# Patient Record
Sex: MALE | Race: WHITE
[De-identification: names, ages, dates, MRNs, and addresses within clinical notes are randomized per-mention and may not be internally consistent; named-entity substitution may affect disease eponyms.]

---

## 2018-06-09 ENCOUNTER — HOSPITAL ENCOUNTER (EMERGENCY)
Dept: HOSPITAL 50 - VM.ED | Age: 33
Discharge: HOME | End: 2018-06-09
Payer: COMMERCIAL

## 2018-06-09 DIAGNOSIS — S71.111A: Primary | ICD-10-CM

## 2018-06-09 DIAGNOSIS — F17.210: ICD-10-CM
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
## 2018-06-09 NOTE — EDM.PDOC
ED HPI GENERAL MEDICAL PROBLEM





- General


Chief Complaint: Lower Extremity Injury/Pain


Stated Complaint: chainsaw cut


Time Seen by Provider: 06/09/18 15:56


Source of Information: Reports: Patient


History Limitations: Reports: No Limitations





- History of Present Illness


INITIAL COMMENTS - FREE TEXT/NARRATIVE: 





Patient was cutting wood and during his last cut the wood kicked back, causing 

the blade to hit his leg above the right knee.  He rates pain a 5/10 and is 

bearable.  He has no other complaints and has a belt wrapped around his leg.  


Onset: Today, Sudden


Onset Time: 16:00


Location: Reports: Lower Extremity, Right


Quality: Reports: Ache


Severity: Mild


  ** Right Upper Leg


Pain Score (Numeric/FACES): 5





- Related Data


 Allergies











Allergy/AdvReac Type Severity Reaction Status Date / Time


 


No Known Allergies Allergy   Verified 06/09/18 16:20











Home Meds: 


 Home Meds





. [No Known Home Meds]  06/09/18 [History]











Past Medical History





- Past Health History


Medical/Surgical History: Denies Medical/Surgical History





Social & Family History





- Tobacco Use


Smoking Status *Q: Current Every Day Smoker


Years of Tobacco use: 20


Packs/Tins Daily: 1





Review of Systems





- Review of Systems


Review Of Systems: See Below


Constitutional: Reports: No Symptoms


Eyes: Reports: No Symptoms


Ears: Reports: No Symptoms


Nose: Reports: No Symptoms


Mouth/Throat: Reports: No Symptoms


Respiratory: Reports: No Symptoms


Cardiovascular: Reports: No Symptoms


GI/Abdominal: Reports: No Symptoms


Genitourinary: Reports: No Symptoms


Musculoskeletal: Reports: No Symptoms


Skin: Reports: Wound


Neurological: Reports: No Symptoms


Psychiatric: Reports: No Symptoms





ED EXAM, GENERAL





- Physical Exam


Exam: See Below


Exam Limited By: No Limitations


General Appearance: Alert, WD/WN, No Apparent Distress


Peripheral Pulses: 2+: Posterior Tibial (L), Posterior Tibial (R), Dorsalis 

Pedis (L), Dorsalis Pedis (R)


Extremities: Normal Range of Motion, Non-Tender, Normal Capillary Refill


Neurological: Alert, Oriented, CN II-XII Intact, Normal Cognition, Normal Gait, 

Normal Reflexes, No Motor/Sensory Deficits


Psychiatric: Normal Affect, Normal Mood


Skin Exam: Wound/Incision (9 cm laceration to right lower thigh)


Lymphatic: No Adenopathy





ED TRAUMA EXTREMITY PROCEDURES





- Laceration/Wound Repair


  ** Right Lower Thigh


Lac/Wound Length In cm: 9


Appearance: Superficial, Linear


Distal NVT: Neuro & Vascular Intact


Anesthetic Type: Local


Local Anesthesia - Lidocaine (Xylocaine): 1% with EPI


Local Anesthetic Volume: 5cc


Skin Prep: Chlorhexidine (Hibiciens)


Exploration/Debridement/Repair: Wound Explored, In a Bloodless Field, No 

Foreign Material Found


Suture Size: 4-0


Suture Type: Running


Sterile Dressing Applied: Nurse


Tetanus Status Addressed: Yes


Complications: No





Course





- Vital Signs


Last Recorded V/S: 





 Last Vital Signs











Temp  36.3 C   06/09/18 15:54


 


Pulse  94   06/09/18 15:54


 


Resp  18   06/09/18 15:54


 


BP  138/85   06/09/18 15:54


 


Pulse Ox  97   06/09/18 15:54














- Orders/Labs/Meds


Orders: 





 Active Orders 24 hr











 Category Date Time Status


 


 Femur Min 2V Rt [CR] Stat Exams  06/09/18 16:16 Ordered











Meds: 





Medications














Discontinued Medications














Generic Name Dose Route Start Last Admin





  Trade Name Juan  PRN Reason Stop Dose Admin


 


Lidocaine/Epinephrine  20 ml  06/09/18 16:04  





  Xylocaine 2% With Epinephrine 1:100,000  INJECT  06/09/18 16:05  





  ONETIME ONE   





     





     





     





     














- Radiology Interpretation


Free Text/Narrative:: 





x-ray negative for foreign body, nothing seen on inspection and cleaning either





Departure





- Departure


Time of Disposition: 17:14


Disposition: Home, Self-Care 01


Condition: Good


Clinical Impression: 


 Laceration of thigh, right








- Discharge Information


Instructions:  Laceration Care, Adult, Wound Infection, Easy-to-Read


Additional Instructions: 


Please remove sutures in 10 days





See your primary provider for any additional symptom managment





Keep clean and dry.  Wash with soap and water and you may shower.





Do not let soak in standing water.





Please call with any questions or concerns





- Problem List & Annotations


(1) Laceration of thigh, right


SNOMED Code(s): 729209253


   Code(s): S71.111A - LACERATION WITHOUT FOREIGN BODY, RIGHT THIGH, INIT 

ENCNTR   Status: Acute   Priority: Low   Current Visit: Yes   


Qualifiers: 


   Encounter type: initial encounter   Qualified Code(s): S71.111A - Laceration 

without foreign body, right thigh, initial encounter   





- Problem List Review


Problem List Initiated/Reviewed/Updated: Yes





- My Orders


Last 24 Hours: 





My Active Orders





06/09/18 16:16


Femur Min 2V Rt [CR] Stat 














- Assessment/Plan


Last 24 Hours: 





My Active Orders





06/09/18 16:16


Femur Min 2V Rt [CR] Stat 











Assessment:: 





right thigh laceration


Plan: 





Please remove sutures in 10 days





See your primary provider for any additional symptom managment





Keep clean and dry.  Wash with soap and water and you may shower.





Do not let soak in standing water.





Please call with any questions or concerns